# Patient Record
Sex: MALE | Race: WHITE | NOT HISPANIC OR LATINO | Employment: STUDENT | URBAN - NONMETROPOLITAN AREA
[De-identification: names, ages, dates, MRNs, and addresses within clinical notes are randomized per-mention and may not be internally consistent; named-entity substitution may affect disease eponyms.]

---
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
## 2023-02-06 ENCOUNTER — HOSPITAL ENCOUNTER (EMERGENCY)
Facility: HOSPITAL | Age: 18
Discharge: PSYCHIATRIC OR SUBSTANCE USE FACILITY | DRG: 881 | End: 2023-02-06
Attending: EMERGENCY MEDICINE | Admitting: EMERGENCY MEDICINE
Payer: COMMERCIAL

## 2023-02-06 ENCOUNTER — HOSPITAL ENCOUNTER (INPATIENT)
Facility: HOSPITAL | Age: 18
LOS: 2 days | Discharge: HOME | DRG: 881 | End: 2023-02-08
Attending: PSYCHIATRY & NEUROLOGY | Admitting: PSYCHIATRY & NEUROLOGY
Payer: COMMERCIAL

## 2023-02-06 VITALS
WEIGHT: 195 LBS | SYSTOLIC BLOOD PRESSURE: 140 MMHG | DIASTOLIC BLOOD PRESSURE: 74 MMHG | OXYGEN SATURATION: 97 % | HEART RATE: 78 BPM | HEIGHT: 72 IN | BODY MASS INDEX: 26.41 KG/M2 | RESPIRATION RATE: 18 BRPM | TEMPERATURE: 97.7 F

## 2023-02-06 DIAGNOSIS — R45.851 SUICIDAL IDEATION: Primary | ICD-10-CM

## 2023-02-06 PROBLEM — F32.A DEPRESSION WITH SUICIDAL IDEATION: Status: ACTIVE | Noted: 2023-02-06

## 2023-02-06 LAB
ALBUMIN SERPL-MCNC: 4.8 G/DL (ref 3.5–5.2)
ALBUMIN/GLOB SERPL: 1.9 G/DL
ALP SERPL-CCNC: 52 U/L (ref 39–117)
ALT SERPL W P-5'-P-CCNC: 11 U/L (ref 1–41)
AMPHET+METHAMPHET UR QL: NEGATIVE
AMPHETAMINES UR QL: NEGATIVE
ANION GAP SERPL CALCULATED.3IONS-SCNC: 9.1 MMOL/L (ref 5–15)
AST SERPL-CCNC: 15 U/L (ref 1–40)
BACTERIA UR QL AUTO: ABNORMAL /HPF
BARBITURATES UR QL SCN: NEGATIVE
BASOPHILS # BLD AUTO: 0.02 10*3/MM3 (ref 0–0.2)
BASOPHILS NFR BLD AUTO: 0.5 % (ref 0–1.5)
BENZODIAZ UR QL SCN: NEGATIVE
BILIRUB SERPL-MCNC: 1 MG/DL (ref 0–1.2)
BILIRUB UR QL STRIP: ABNORMAL
BUN SERPL-MCNC: 10 MG/DL (ref 6–20)
BUN/CREAT SERPL: 10.5 (ref 7–25)
BUPRENORPHINE SERPL-MCNC: NEGATIVE NG/ML
CALCIUM SPEC-SCNC: 9.5 MG/DL (ref 8.6–10.5)
CANNABINOIDS SERPL QL: POSITIVE
CHLORIDE SERPL-SCNC: 104 MMOL/L (ref 98–107)
CLARITY UR: CLEAR
CO2 SERPL-SCNC: 26.9 MMOL/L (ref 22–29)
COCAINE UR QL: NEGATIVE
COLOR UR: ABNORMAL
CREAT SERPL-MCNC: 0.95 MG/DL (ref 0.76–1.27)
DEPRECATED RDW RBC AUTO: 39.5 FL (ref 37–54)
EGFRCR SERPLBLD CKD-EPI 2021: 118.2 ML/MIN/1.73
EOSINOPHIL # BLD AUTO: 0.12 10*3/MM3 (ref 0–0.4)
EOSINOPHIL NFR BLD AUTO: 2.9 % (ref 0.3–6.2)
ERYTHROCYTE [DISTWIDTH] IN BLOOD BY AUTOMATED COUNT: 12 % (ref 12.3–15.4)
ETHANOL BLD-MCNC: <10 MG/DL (ref 0–10)
ETHANOL UR QL: <0.01 %
FLUAV RNA RESP QL NAA+PROBE: NOT DETECTED
FLUBV RNA RESP QL NAA+PROBE: NOT DETECTED
GLOBULIN UR ELPH-MCNC: 2.5 GM/DL
GLUCOSE SERPL-MCNC: 86 MG/DL (ref 65–99)
GLUCOSE UR STRIP-MCNC: NEGATIVE MG/DL
HCT VFR BLD AUTO: 47.2 % (ref 37.5–51)
HGB BLD-MCNC: 15.9 G/DL (ref 13–17.7)
HGB UR QL STRIP.AUTO: NEGATIVE
HYALINE CASTS UR QL AUTO: ABNORMAL /LPF
IMM GRANULOCYTES # BLD AUTO: 0.01 10*3/MM3 (ref 0–0.05)
IMM GRANULOCYTES NFR BLD AUTO: 0.2 % (ref 0–0.5)
KETONES UR QL STRIP: ABNORMAL
LEUKOCYTE ESTERASE UR QL STRIP.AUTO: NEGATIVE
LYMPHOCYTES # BLD AUTO: 1.78 10*3/MM3 (ref 0.7–3.1)
LYMPHOCYTES NFR BLD AUTO: 42.7 % (ref 19.6–45.3)
MAGNESIUM SERPL-MCNC: 2.2 MG/DL (ref 1.7–2.2)
MCH RBC QN AUTO: 30.2 PG (ref 26.6–33)
MCHC RBC AUTO-ENTMCNC: 33.7 G/DL (ref 31.5–35.7)
MCV RBC AUTO: 89.6 FL (ref 79–97)
METHADONE UR QL SCN: NEGATIVE
MONOCYTES # BLD AUTO: 0.35 10*3/MM3 (ref 0.1–0.9)
MONOCYTES NFR BLD AUTO: 8.4 % (ref 5–12)
NEUTROPHILS NFR BLD AUTO: 1.89 10*3/MM3 (ref 1.7–7)
NEUTROPHILS NFR BLD AUTO: 45.3 % (ref 42.7–76)
NITRITE UR QL STRIP: NEGATIVE
NRBC BLD AUTO-RTO: 0 /100 WBC (ref 0–0.2)
OPIATES UR QL: NEGATIVE
OXYCODONE UR QL SCN: NEGATIVE
PCP UR QL SCN: NEGATIVE
PH UR STRIP.AUTO: 5.5 [PH] (ref 5–8)
PLATELET # BLD AUTO: 214 10*3/MM3 (ref 140–450)
PMV BLD AUTO: 9.8 FL (ref 6–12)
POTASSIUM SERPL-SCNC: 3.7 MMOL/L (ref 3.5–5.2)
PROPOXYPH UR QL: NEGATIVE
PROT SERPL-MCNC: 7.3 G/DL (ref 6–8.5)
PROT UR QL STRIP: ABNORMAL
QT INTERVAL: 392 MS
QTC INTERVAL: 388 MS
RBC # BLD AUTO: 5.27 10*6/MM3 (ref 4.14–5.8)
RBC # UR STRIP: ABNORMAL /HPF
REF LAB TEST METHOD: ABNORMAL
SARS-COV-2 RNA RESP QL NAA+PROBE: NOT DETECTED
SODIUM SERPL-SCNC: 140 MMOL/L (ref 136–145)
SP GR UR STRIP: >1.03 (ref 1–1.03)
SQUAMOUS #/AREA URNS HPF: ABNORMAL /HPF
TRICYCLICS UR QL SCN: NEGATIVE
UROBILINOGEN UR QL STRIP: ABNORMAL
WBC # UR STRIP: ABNORMAL /HPF
WBC NRBC COR # BLD: 4.17 10*3/MM3 (ref 3.4–10.8)

## 2023-02-06 PROCEDURE — 83735 ASSAY OF MAGNESIUM: CPT | Performed by: EMERGENCY MEDICINE

## 2023-02-06 PROCEDURE — 93005 ELECTROCARDIOGRAM TRACING: CPT | Performed by: PSYCHIATRY & NEUROLOGY

## 2023-02-06 PROCEDURE — 99284 EMERGENCY DEPT VISIT MOD MDM: CPT

## 2023-02-06 PROCEDURE — 36415 COLL VENOUS BLD VENIPUNCTURE: CPT

## 2023-02-06 PROCEDURE — C9803 HOPD COVID-19 SPEC COLLECT: HCPCS

## 2023-02-06 PROCEDURE — 87636 SARSCOV2 & INF A&B AMP PRB: CPT | Performed by: EMERGENCY MEDICINE

## 2023-02-06 PROCEDURE — 81001 URINALYSIS AUTO W/SCOPE: CPT | Performed by: EMERGENCY MEDICINE

## 2023-02-06 PROCEDURE — 85025 COMPLETE CBC W/AUTO DIFF WBC: CPT | Performed by: EMERGENCY MEDICINE

## 2023-02-06 PROCEDURE — 80053 COMPREHEN METABOLIC PANEL: CPT | Performed by: EMERGENCY MEDICINE

## 2023-02-06 PROCEDURE — 80306 DRUG TEST PRSMV INSTRMNT: CPT | Performed by: EMERGENCY MEDICINE

## 2023-02-06 PROCEDURE — 82077 ASSAY SPEC XCP UR&BREATH IA: CPT | Performed by: EMERGENCY MEDICINE

## 2023-02-06 RX ORDER — FAMOTIDINE 20 MG/1
20 TABLET, FILM COATED ORAL 2 TIMES DAILY PRN
Status: DISCONTINUED | OUTPATIENT
Start: 2023-02-06 | End: 2023-02-08 | Stop reason: HOSPADM

## 2023-02-06 RX ORDER — IBUPROFEN 400 MG/1
400 TABLET ORAL EVERY 6 HOURS PRN
Status: DISCONTINUED | OUTPATIENT
Start: 2023-02-06 | End: 2023-02-08 | Stop reason: HOSPADM

## 2023-02-06 RX ORDER — BENZTROPINE MESYLATE 1 MG/1
2 TABLET ORAL ONCE AS NEEDED
Status: DISCONTINUED | OUTPATIENT
Start: 2023-02-06 | End: 2023-02-08 | Stop reason: HOSPADM

## 2023-02-06 RX ORDER — ONDANSETRON 4 MG/1
4 TABLET, FILM COATED ORAL EVERY 6 HOURS PRN
Status: DISCONTINUED | OUTPATIENT
Start: 2023-02-06 | End: 2023-02-08 | Stop reason: HOSPADM

## 2023-02-06 RX ORDER — HYDROXYZINE HYDROCHLORIDE 25 MG/1
50 TABLET, FILM COATED ORAL EVERY 6 HOURS PRN
Status: DISCONTINUED | OUTPATIENT
Start: 2023-02-06 | End: 2023-02-08 | Stop reason: HOSPADM

## 2023-02-06 RX ORDER — BENZONATATE 100 MG/1
100 CAPSULE ORAL 3 TIMES DAILY PRN
Status: DISCONTINUED | OUTPATIENT
Start: 2023-02-06 | End: 2023-02-08 | Stop reason: HOSPADM

## 2023-02-06 RX ORDER — ACETAMINOPHEN 325 MG/1
650 TABLET ORAL EVERY 6 HOURS PRN
Status: DISCONTINUED | OUTPATIENT
Start: 2023-02-06 | End: 2023-02-08 | Stop reason: HOSPADM

## 2023-02-06 RX ORDER — LOPERAMIDE HYDROCHLORIDE 2 MG/1
2 CAPSULE ORAL
Status: DISCONTINUED | OUTPATIENT
Start: 2023-02-06 | End: 2023-02-08 | Stop reason: HOSPADM

## 2023-02-06 RX ORDER — ALUMINA, MAGNESIA, AND SIMETHICONE 2400; 2400; 240 MG/30ML; MG/30ML; MG/30ML
15 SUSPENSION ORAL EVERY 6 HOURS PRN
Status: DISCONTINUED | OUTPATIENT
Start: 2023-02-06 | End: 2023-02-08 | Stop reason: HOSPADM

## 2023-02-06 RX ORDER — TRAZODONE HYDROCHLORIDE 50 MG/1
50 TABLET ORAL NIGHTLY PRN
Status: DISCONTINUED | OUTPATIENT
Start: 2023-02-06 | End: 2023-02-08 | Stop reason: HOSPADM

## 2023-02-06 RX ORDER — BENZTROPINE MESYLATE 1 MG/ML
1 INJECTION INTRAMUSCULAR; INTRAVENOUS ONCE AS NEEDED
Status: DISCONTINUED | OUTPATIENT
Start: 2023-02-06 | End: 2023-02-08 | Stop reason: HOSPADM

## 2023-02-06 RX ORDER — ECHINACEA PURPUREA EXTRACT 125 MG
2 TABLET ORAL AS NEEDED
Status: DISCONTINUED | OUTPATIENT
Start: 2023-02-06 | End: 2023-02-08 | Stop reason: HOSPADM

## 2023-02-06 RX ADMIN — ACETAMINOPHEN 650 MG: 325 TABLET ORAL at 22:23

## 2023-02-06 RX ADMIN — TRAZODONE HYDROCHLORIDE 50 MG: 50 TABLET ORAL at 22:23

## 2023-02-06 NOTE — ED PROVIDER NOTES
"Subjective   History of Present Illness  Patient is a 17-year-old male with significant past medical history positive for depression presenting to the ER for psychiatric evaluation for suicidal ideation.  Patient states \"yesterday I had a really bad episode of suicidal ideation, it may perhaps be a pattern of johan and depression, maybe bipolar, I have no idea, I wouldn't have killed myself, but hurt myself if I wouldn't have had a close friend with me, I am fine right now, but it changes very rapidly\".  States he called his parents and they drove to his college in [place] and picked him up, states the was fine by the time they got there, but it happens very rapidly.  Patient states MDD and suicidal ideations have been happening since the age of 11, and got really bad at the age of 13, states \"I know bipolar runs in my family, I have had mood swings all my life, I don't want to self diagnose obviously, but I am always up and down\".  Pt states \"I feel fine today honestly, but yesterday, if my girlfriend was not with me yesterday I would have been cutting myself\".  Pt states \"this has been happening for a couple of years intensely now, but yesterday I was an immediate threat to myself\".  Patient denies SI at this time, but states \"I can't for see what is going to happen\".  Patient denies any HI/AVH.  Patient states he drinks alcohol occasionally at college states he \"eats\" mushrooms and does LSD to help him be at peace with the world and himself and absorb information. Last use was about 1 month ago.  Patient denies any additional symptoms at this time.         History provided by:  Patient   used: No        Review of Systems   Constitutional: Negative.  Negative for fever.   HENT: Negative.    Respiratory: Negative.    Cardiovascular: Negative.  Negative for chest pain.   Gastrointestinal: Negative.  Negative for abdominal pain.   Endocrine: Negative.    Genitourinary: Negative.  Negative " for dysuria.   Skin: Negative.    Neurological: Negative.    Psychiatric/Behavioral: Positive for suicidal ideas.   All other systems reviewed and are negative.      Past Medical History:   Diagnosis Date   • Depression    • Headache    • Knee pain     left   • Upper leg pain left        No Known Allergies    Past Surgical History:   Procedure Laterality Date   • FEMUR OSTEOCHONDROMA Left 5/29/2018    Procedure: EXCISION LEFT DISTAL FEMUR OSTEOCHONDROMA;  Surgeon: Vikram Barraza MD;  Location: [facility] OR;  Service: Orthopedics   • KNEE ARTHROSCOPY Left 6/14/2018    Procedure: KNEE ARTHROSCOPY WITH LATERAL MENISCUS REPAIR;  Surgeon: Vikram Barraza MD;  Location: [facility] OR;  Service: Orthopedics       Family History   Problem Relation Age of Onset   • Osteoarthritis Mother    • Osteoarthritis Father    • Osteoarthritis Paternal Grandmother    • Cancer Paternal Grandmother    • Osteoarthritis Paternal Grandfather        Social History     Socioeconomic History   • Marital status: Single   Tobacco Use   • Smoking status: Never   • Smokeless tobacco: Never   Vaping Use   • Vaping Use: Never used   Substance and Sexual Activity   • Alcohol use: Yes     Comment: occasionally   • Drug use: Yes     Types: Marijuana, LSD, Other     Comment: mushrooms   • Sexual activity: Yes     Partners: Female           Objective   Physical Exam  Vitals and nursing note reviewed.   Constitutional:       General: He is not in acute distress.     Appearance: He is well-developed. He is not ill-appearing or diaphoretic.   HENT:      Head: Normocephalic and atraumatic.      Right Ear: External ear normal.      Left Ear: External ear normal.      Nose: Nose normal.   Eyes:      Conjunctiva/sclera: Conjunctivae normal.      Pupils: Pupils are equal, round, and reactive to light.   Neck:      Vascular: No JVD.      Trachea: No tracheal deviation.   Cardiovascular:      Rate and Rhythm: Normal rate and regular rhythm.      Heart  sounds: Normal heart sounds. No murmur heard.  Pulmonary:      Effort: Pulmonary effort is normal. No respiratory distress.      Breath sounds: Normal breath sounds. No wheezing.   Abdominal:      General: Bowel sounds are normal.      Palpations: Abdomen is soft.      Tenderness: There is no abdominal tenderness.   Musculoskeletal:         General: No deformity. Normal range of motion.      Cervical back: Normal range of motion and neck supple.   Skin:     General: Skin is warm and dry.      Coloration: Skin is not pale.      Findings: No erythema or rash.   Neurological:      Mental Status: He is alert and oriented to person, place, and time.      Cranial Nerves: No cranial nerve deficit.   Psychiatric:         Mood and Affect: Mood is depressed.         Thought Content: Thought content is not paranoid or delusional. Thought content includes suicidal ideation. Thought content does not include homicidal ideation. Thought content does not include homicidal or suicidal plan.         Procedures       Results for orders placed or performed during the hospital encounter of 02/06/23   COVID-19 and FLU A/B PCR - Swab, Nasopharynx    Specimen: Nasopharynx; Swab   Result Value Ref Range    COVID19 Not Detected Not Detected - Ref. Range    Influenza A PCR Not Detected Not Detected    Influenza B PCR Not Detected Not Detected   Comprehensive Metabolic Panel    Specimen: Arm, Right; Blood   Result Value Ref Range    Glucose 86 65 - 99 mg/dL    BUN 10 6 - 20 mg/dL    Creatinine 0.95 0.76 - 1.27 mg/dL    Sodium 140 136 - 145 mmol/L    Potassium 3.7 3.5 - 5.2 mmol/L    Chloride 104 98 - 107 mmol/L    CO2 26.9 22.0 - 29.0 mmol/L    Calcium 9.5 8.6 - 10.5 mg/dL    Total Protein 7.3 6.0 - 8.5 g/dL    Albumin 4.8 3.5 - 5.2 g/dL    ALT (SGPT) 11 1 - 41 U/L    AST (SGOT) 15 1 - 40 U/L    Alkaline Phosphatase 52 39 - 117 U/L    Total Bilirubin 1.0 0.0 - 1.2 mg/dL    Globulin 2.5 gm/dL    A/G Ratio 1.9 g/dL    BUN/Creatinine Ratio 10.5  7.0 - 25.0    Anion Gap 9.1 5.0 - 15.0 mmol/L    eGFR 118.2 >60.0 mL/min/1.73   Urinalysis With Microscopic If Indicated (No Culture) - Urine, Clean Catch    Specimen: Urine, Clean Catch   Result Value Ref Range    Color, UA Dark Yellow (A) Yellow, Straw    Appearance, UA Clear Clear    pH, UA 5.5 5.0 - 8.0    Specific Gravity, UA >1.030 (H) 1.005 - 1.030    Glucose, UA Negative Negative    Ketones, UA Trace (A) Negative    Bilirubin, UA Small (1+) (A) Negative    Blood, UA Negative Negative    Protein, UA 30 mg/dL (1+) (A) Negative    Leuk Esterase, UA Negative Negative    Nitrite, UA Negative Negative    Urobilinogen, UA 1.0 E.U./dL 0.2 - 1.0 E.U./dL   Urine Drug Screen - Urine, Clean Catch    Specimen: Urine, Clean Catch   Result Value Ref Range    THC, Screen, Urine Positive (A) Negative    Phencyclidine (PCP), Urine Negative Negative    Cocaine Screen, Urine Negative Negative    Methamphetamine, Ur Negative Negative    Opiate Screen Negative Negative    Amphetamine Screen, Urine Negative Negative    Benzodiazepine Screen, Urine Negative Negative    Tricyclic Antidepressants Screen Negative Negative    Methadone Screen, Urine Negative Negative    Barbiturates Screen, Urine Negative Negative    Oxycodone Screen, Urine Negative Negative    Propoxyphene Screen Negative Negative    Buprenorphine, Screen, Urine Negative Negative   Magnesium    Specimen: Arm, Right; Blood   Result Value Ref Range    Magnesium 2.2 1.7 - 2.2 mg/dL   Ethanol    Specimen: Arm, Right; Blood   Result Value Ref Range    Ethanol <10 0 - 10 mg/dL    Ethanol % <0.010 %   CBC Auto Differential    Specimen: Arm, Right; Blood   Result Value Ref Range    WBC 4.17 3.40 - 10.80 10*3/mm3    RBC 5.27 4.14 - 5.80 10*6/mm3    Hemoglobin 15.9 13.0 - 17.7 g/dL    Hematocrit 47.2 37.5 - 51.0 %    MCV 89.6 79.0 - 97.0 fL    MCH 30.2 26.6 - 33.0 pg    MCHC 33.7 31.5 - 35.7 g/dL    RDW 12.0 (L) 12.3 - 15.4 %    RDW-SD 39.5 37.0 - 54.0 fl    MPV 9.8 6.0 - 12.0  "fL    Platelets 214 140 - 450 10*3/mm3    Neutrophil % 45.3 42.7 - 76.0 %    Lymphocyte % 42.7 19.6 - 45.3 %    Monocyte % 8.4 5.0 - 12.0 %    Eosinophil % 2.9 0.3 - 6.2 %    Basophil % 0.5 0.0 - 1.5 %    Immature Grans % 0.2 0.0 - 0.5 %    Neutrophils, Absolute 1.89 1.70 - 7.00 10*3/mm3    Lymphocytes, Absolute 1.78 0.70 - 3.10 10*3/mm3    Monocytes, Absolute 0.35 0.10 - 0.90 10*3/mm3    Eosinophils, Absolute 0.12 0.00 - 0.40 10*3/mm3    Basophils, Absolute 0.02 0.00 - 0.20 10*3/mm3    Immature Grans, Absolute 0.01 0.00 - 0.05 10*3/mm3    nRBC 0.0 0.0 - 0.2 /100 WBC   Urinalysis, Microscopic Only - Urine, Clean Catch    Specimen: Urine, Clean Catch   Result Value Ref Range    RBC, UA 0-2 None Seen, 0-2 /HPF    WBC, UA 0-2 None Seen, 0-2 /HPF    Bacteria, UA 1+ (A) None Seen /HPF    Squamous Epithelial Cells, UA 0-2 None Seen, 0-2 /HPF    Hyaline Casts, UA 3-6 None Seen /LPF    Methodology Automated Microscopy      No orders to display       ED Course                                           Medical Decision Making  Patient is a 17-year-old male with significant past medical history positive for depression presenting to the ER for psychiatric evaluation for suicidal ideation.  Patient states \"yesterday I had a really bad episode of suicidal ideation, it may perhaps be a pattern of johan and depression, maybe bipolar, I have no idea, I wouldn't have killed myself, but hurt myself if I wouldn't have had a close friend with me, I am fine right now, but it changes very rapidly\".  States he called his parents and they drove to his college in [place] and picked him up, states the was fine by the time they got there, but it happens very rapidly.  Patient states MDD and suicidal ideations have been happening since the age of 11, and got really bad at the age of 13, states \"I know bipolar runs in my family, I have had mood swings all my life, I don't want to self diagnose obviously, but I am always up and down\".  Pt " "states \"I feel fine today honestly, but yesterday, if my girlfriend was not with me yesterday I would have been cutting myself\".  Pt states \"this has been happening for a couple of years intensely now, but yesterday I was an immediate threat to myself\".  Patient denies SI at this time, but states \"I can't for see what is going to happen\".  Patient denies any HI/AVH.  Patient states he drinks alcohol occasionally at college states he \"eats\" mushrooms and does LSD to help him be at peace with the world and himself and absorb information. Last use was about 1 month ago.  Patient denies any additional symptoms at this time.  Patient's ED course unremarkable patient to be admitted to psychiatric unit for further evaluation.  Patient verbalized understanding of plan of care.         Suicidal ideation: complicated acute illness or injury  Amount and/or Complexity of Data Reviewed  Labs: ordered.          Final diagnoses:   Suicidal ideation       ED Disposition  ED Disposition     ED Disposition   DC/Transfer to Behavioral Health    [facility]   Stable    Comment   --             No follow-up provider specified.       Medication List      No changes were made to your prescriptions during this visit.          Merna Rader, APRN  02/06/23 [id number]    "

## 2023-02-06 NOTE — NURSING NOTE
"Pt states \"yesterday I had a really bad episode of suicidal ideation, it may perhaps be a pattern of johan and depression, maybe bipolar, I have no idea, I wouldn't have killed myself, but hurt myself if I wouldn't have had a close friend with me, I am fine right now, but it changes very rapidly\".  States he called his parents and they drove to his college in [place] and picked him up, states the was fine by the time they got there, but it happens very rapidly.  Pt states MDD and suicidal ideations have been happening since the age of 11, and got really bad at the age of 13, states \"I know bipolar runs in my family, I have had mood swings all my life, I don't want to self diagnose obviously, but I am always up and down\".  Pt states \"I feel fine today honestly, but yesterday, if my girlfriend was not with me yesterday I would have been cutting myself\".  Pt states \"this has been happening for a couple of years intensely now, but yesterday I was an immediate threat to myself\".    Pt denies SI at this time, but states \"I can't for see what is going to happen\"    Pt denies any HI/AVH    Pt states he drinks alcohol occasionally at college     Pt states he smokes cannabis 4-5 times weekly, smoking less than 1/2 gram at a time, states he last smoked 2 days ago.    Pt states he \"eats\" mushrooms and does LSD to help him be at peace with the world and himself and absorb information. Last use was about 1 month ago.     Pt is a full time student at [employer]     Pt rates depression 3/10 and anxiety 5/10 at this time   "

## 2023-02-06 NOTE — PLAN OF CARE
Problem: Adult Behavioral Health Plan of Care  Goal: Plan of Care Review  Recent Flowsheet Documentation  Taken 2/6/2023 1755 by Jessie Mazariegos, RN  Plan of Care Reviewed With: patient  Patient Agreement with Plan of Care: agrees   Goal Outcome Evaluation:  Plan of Care Reviewed With: patient  Patient Agreement with Plan of Care: agrees      New admission.  Care plan initiated.

## 2023-02-06 NOTE — NURSING NOTE
Pt is aware of no bed availability at this time, but that there will be discharges and a bed available within the day, and is verbally agreeable to wait.

## 2023-02-06 NOTE — NURSING NOTE
Spoke to Dr. Almanza, discussed assessment and labs, new orders to admit the patient to A&E with routine orders SP3. [id number]

## 2023-02-07 PROCEDURE — 99223 1ST HOSP IP/OBS HIGH 75: CPT | Performed by: PSYCHIATRY & NEUROLOGY

## 2023-02-07 RX ADMIN — TRAZODONE HYDROCHLORIDE 50 MG: 50 TABLET ORAL at 21:28

## 2023-02-07 RX ADMIN — SERTRALINE 25 MG: 50 TABLET, FILM COATED ORAL at 14:24

## 2023-02-07 RX ADMIN — ACETAMINOPHEN 650 MG: 325 TABLET ORAL at 17:57

## 2023-02-07 NOTE — PLAN OF CARE
Problem: Adult Behavioral Health Plan of Care  Goal: Plan of Care Review  Outcome: Ongoing, Progressing  Flowsheets  Taken 2/7/2023 1429 by Chavo Conley, RN  Plan of Care Reviewed With: patient  Outcome Evaluation: PATIENT VERBALIZES ANXIETY AND DEPRESSION THIS SHIFT BUT WILL NOT RATE ON NUMBER SCALE. PATIENT IS AOX3, COOPERATIVE WITH NO S/S OF ACUTE DISTRESS NOTED. NEW ORDERS : ZOLOFT 25MG  Taken 2/7/2023 1045 by Alethea rPide MSW  Progress: improving  Patient Agreement with Plan of Care: agrees  Taken 2/7/2023 0949 by Chavo Conley, RN  Plan of Care Reviewed With: patient  Patient Agreement with Plan of Care: agrees   Goal Outcome Evaluation:  Plan of Care Reviewed With: patient  Patient Agreement with Plan of Care: agrees        Outcome Evaluation: PATIENT VERBALIZES ANXIETY AND DEPRESSION THIS SHIFT BUT WILL NOT RATE ON NUMBER SCALE. PATIENT IS AOX3, COOPERATIVE WITH NO S/S OF ACUTE DISTRESS NOTED. NEW ORDERS : ZOLOFT 25MG

## 2023-02-07 NOTE — PLAN OF CARE
Goal Outcome Evaluation:  Plan of Care Reviewed With: patient  Patient Agreement with Plan of Care: agrees        Pt states anxiety 6, depression 6. Pt is calm and cooperative with staff, med compliant.

## 2023-02-07 NOTE — H&P
INITIAL PSYCHIATRIC HISTORY & PHYSICAL    Patient Identification:  Name:  Ravinder Irwin  Age:  17 y.o.  Sex:  male  :  2005  MRN:  [id number]   Visit Number:  [id number]  Primary Care Physician:  Edenilson Subramanian MD    SUBJECTIVE    CC/Focus of Exam: depression, concern for SI    HPI: Ravinder Irwin is a 17 y.o. male who was admitted on 2023 with complaints of depression and concern for SI.      Patient reports a history of rapid mood swings, elevated anxiety, low frustration tolerance, obsessive thinking that often leads to perseveration on negative thoughts and attitudes about himself.  He reports it is often these obsessive thoughts that lead to his mood disturbance, anxiety and SI.  He has a history of self-harm, but none in the last year.  He was concern for the worsening of possible suicidality, prompting him to call his parents, who picked him up from college and brought him to the hospital.  Symptoms are severe, persistent, present multiple settings, worse in the last several months, worse by self doubt, improved by therapy.      PAST PSYCHIATRIC HX:   Dx: Autism  IP: denies   OP: Patient reports previous therapy at multiple times in his life  Current meds: Patient denies any  Previous meds: Patient denies any  SH/SI/SA: Self harm via cutting/intermittent/denied  Trauma: Denied    SUBSTANCE USE HX:   Patient reports intermittent use of THC and mushrooms.  UDS was positive for THC.       SOCIAL HX: Patient states that he was born in [place], [place]. Patient states that he was raised in [place], [place]. Patient states that he resides with his parents in [place] when he is not in school but resides in [facility] When he is in school.  Patient states that he is single and has no children. Patient states that he is currently unemployed. Patient states that he is currently enrolled in college and attends [employer].  Patient denies any legal issues.       FAMILY HX: Patient states there is  mental illness on his mother's side of the family.     Family History   Problem Relation Age of Onset   • Osteoarthritis Mother    • Osteoarthritis Father    • Paranoid behavior Maternal Grandmother    • Schizophrenia Maternal Grandmother    • Bipolar disorder Maternal Grandmother    • Osteoarthritis Paternal Grandfather    • Osteoarthritis Paternal Grandmother    • Cancer Paternal Grandmother        Past Medical History:   Diagnosis Date   • Depression    • Headache    • Knee pain     left   • Self-injurious behavior     Reports hx of self harm starting at age 11.       Past Surgical History:   Procedure Laterality Date   • FEMUR OSTEOCHONDROMA Left 5/29/2018    Procedure: EXCISION LEFT DISTAL FEMUR OSTEOCHONDROMA;  Surgeon: Vikram Barraza MD;  Location: [facility] OR;  Service: Orthopedics   • KNEE ARTHROSCOPY Left 6/14/2018    Procedure: KNEE ARTHROSCOPY WITH LATERAL MENISCUS REPAIR;  Surgeon: Vikram Barraza MD;  Location: [facility] OR;  Service: Orthopedics       No medications prior to admission.         ALLERGIES:  Patient has no known allergies.    Temp:  [97.5 °F (36.4 °C)-98 °F (36.7 °C)] 97.5 °F (36.4 °C)  Heart Rate:  [] 101  Resp:  [18] 18  BP: (115-164)/(58-89) 144/79    REVIEW OF SYSTEMS:  Review of Systems   Psychiatric/Behavioral: Positive for dysphoric mood and suicidal ideas. The patient is nervous/anxious.    All other systems reviewed and are negative.       OBJECTIVE    PHYSICAL EXAM:  Physical Exam  Vitals and nursing note reviewed.   Constitutional:       Appearance: He is well-developed.   HENT:      Head: Normocephalic and atraumatic.      Right Ear: External ear normal.      Left Ear: External ear normal.      Nose: Nose normal.   Eyes:      Pupils: Pupils are equal, round, and reactive to light.   Pulmonary:      Effort: Pulmonary effort is normal. No respiratory distress.      Breath sounds: Normal breath sounds.   Abdominal:      General: There is no distension.       Palpations: Abdomen is soft.   Musculoskeletal:         General: No deformity. Normal range of motion.      Cervical back: Normal range of motion and neck supple.   Skin:     General: Skin is warm.      Findings: No rash.   Neurological:      Mental Status: He is alert and oriented to person, place, and time.      Coordination: Coordination normal.         MENTAL STATUS EXAM:   Hygiene:   fair  Cooperation:  Cooperative  Eye Contact:  Good  Psychomotor Behavior:  Appropriate  Affect:  Appropriate  Hopelessness: 5  Speech:  Normal  Thought Process: Linear  Thought Content:  Normal  Suicidal:  None  Homicidal:  None  Hallucinations:  None  Delusion:  None  Memory:  Intact  Orientation:  Person, Place, Time and Situation  Reliability:  fair  Insight:  Poor  Judgment:  Fair  Impulse Control:  Poor      Imaging Results (Last 24 Hours)     ** No results found for the last 24 hours. **           Lab Results   Component Value Date    GLUCOSE 86 02/06/2023    BUN 10 02/06/2023    CREATININE 0.95 02/06/2023    BCR 10.5 02/06/2023    CO2 26.9 02/06/2023    CALCIUM 9.5 02/06/2023    ALBUMIN 4.8 02/06/2023    AST 15 02/06/2023    ALT 11 02/06/2023       Lab Results   Component Value Date    WBC 4.17 02/06/2023    HGB 15.9 02/06/2023    HCT 47.2 02/06/2023    MCV 89.6 02/06/2023     02/06/2023       ECG/EMG Results (most recent)     Procedure Component Value Units Date/Time    ECG 12 Lead Other [[id number]] Collected: 02/06/23 1638     Updated: 02/06/23 2309     QT Interval 392 ms      QTC Interval 388 ms     Narrative:      Test Reason : Baseline Cardiac Status~  Blood Pressure :   */*   mmHG  Vent. Rate :  59 BPM     Atrial Rate :  59 BPM     P-R Int : 130 ms          QRS Dur :  94 ms      QT Int : 392 ms       P-R-T Axes :  56  66  49 degrees     QTc Int : 388 ms    Sinus bradycardia  Otherwise normal ECG  No previous ECGs available  Confirmed by Ann Youssef (2005) on 2/6/2023 11:09:33 PM    Referred By:             Confirmed By: Ann Youssef           Brief Urine Lab Results  (Last result in the past 365 days)      Color   Clarity   Blood   Leuk Est   Nitrite   Protein   CREAT   Urine HCG        02/06/23 1119 Dark Yellow   Clear   Negative   Negative   Negative   30 mg/dL (1+)                 Last Urine Toxicity     LAST URINE TOXICITY RESULTS Latest Ref Rng & Units 2/6/2023    AMPHETAMINES SCREEN, URINE Negative Negative    BARBITURATES SCREEN Negative Negative    BENZODIAZEPINE SCREEN, URINE Negative Negative    BUPRENORPHINEUR Negative Negative    COCAINE SCREEN, URINE Negative Negative    METHADONE SCREEN, URINE Negative Negative    METHAMPHETAMINEUR Negative Negative          Chart, notes, vitals, labs personally reviewed.  Outside [facility] report requested, reviewed, no controlled substances filled in [place] over the last year  UDS results:  + THC  EKG tracing personally reviewed, interpreted as sinus bradycardia, QTc interval 388  Consulted with patient's therapist regarding clinical history and treatment plan    ASSESSMENT & PLAN:    Suicidal Ideation  -Patient reports concern for emergence of SI, but denies active suicidality or intent to harm himself at any point  -Admit for crisis stabilization  -SP3    Unspecified anxiety disorder  -Likely related to ASD.  Presentation has elements of OCD, as well  -Begin sertraline 25 mg daily  -Patient is highly intelligent and well spoken.  He would do well in continued psychotherapy.  We will refer to outpatient therapist and psychiatric care    Autism spectrum disorder  -Prior diagnosis.  Likely contributing to current symptom burden  -Outpatient care as above    Cannabis use disorder, severe, dependence  -Admission UDS positive  -Supportive care  -Ascertain substance abuse treatment plans following discharge    The patient has been admitted for safety and stabilization.  Patient will be monitored for suicidality daily and maintained on Special Precautions Level 3 (q15  min checks) .  The patient will have individual and group therapy with a master's level therapist. A master treatment plan will be developed and agreed upon by the patient and his/her treatment team.  The patient's estimated length of stay in the hospital is 5-7 days.     This note was generated using a scribe, Estefanía Sandoval.  The work documented in this note was completed, reviewed, and approved by the attending psychiatrist as designated Dr. Jeffery Almanza electronic signature.

## 2023-02-07 NOTE — DISCHARGE INSTR - APPOINTMENTS
[facility]  [email or web address]  [address], [place] [address]   [phone] - office  [phone] - fax - please fax d/c summary when available.     You have an appointment with Silvino on February 9 2023 at 11:30am.

## 2023-02-07 NOTE — PLAN OF CARE
Goal Outcome Evaluation:  Plan of Care Reviewed With: patient, mother  Patient Agreement with Plan of Care: agrees  Consent Given to Review Plan with: janki and father  Progress: improving  Outcome Evaluation: Therapist met with Patient to review care plan social history, and aftercare recommendations; Patient agreeable.      Problem: Adult Behavioral Health Plan of Care  Goal: Plan of Care Review  Outcome: Ongoing, Progressing  Flowsheets (Taken 2/7/2023 1045)  Consent Given to Review Plan with: janki and father  Progress: improving  Plan of Care Reviewed With:   patient   mother  Patient Agreement with Plan of Care: agrees  Outcome Evaluation:   Therapist met with Patient to review care plan social history, and aftercare recommendations   Patient agreeable.  Goal: Patient-Specific Goal (Individualization)  Outcome: Ongoing, Progressing  Flowsheets  Taken 2/7/2023 1045 by Alethea Pride MSW  Patient-Specific Goals (Include Timeframe): Identify 2-3 coping skills, safety plan, and deny SI/HI prior to discharge.  Individualized Care Needs: Therapist to offer 1-4 therapy sessions, aftercare planning, safety planning, family education, group therapy, and brief CBT/MI interventions.  Taken 2/7/2023 1019 by Alethea Pride MSW  Patient Personal Strengths:   resourceful   resilient   self-reliant  Patient Vulnerabilities: lacks insight into illness  Taken 2/6/2023 1755 by Jessie Mazariegos RN  Anxieties, Fears or Concerns: None verbalized  Goal: Optimized Coping Skills in Response to Life Stressors  Outcome: Ongoing, Progressing  Flowsheets (Taken 2/7/2023 1045)  Optimized Coping Skills in Response to Life Stressors: making progress toward outcome  Intervention: Promote Effective Coping Strategies  Flowsheets (Taken 2/7/2023 1045)  Supportive Measures:   active listening utilized   counseling provided   goal-setting facilitated   verbalization of feelings encouraged  Goal: Develops/Participates in Therapeutic  [place] to Support Successful Transition  Outcome: Ongoing, Progressing  Flowsheets (Taken 2/7/2023 1045)  Develops/Participates in Therapeutic [place] to Support Successful Transition: making progress toward outcome  Intervention: Foster Therapeutic [place]  Flowsheets (Taken 2/7/2023 1045)  Trust Relationship/Rapport:   care explained   questions encouraged   choices provided   reassurance provided   emotional support provided   thoughts/feelings acknowledged   empathic listening provided   questions answered  Intervention: Mutually Develop Transition Plan  Flowsheets  Taken 2/7/2023 1045 by Alethea Pride MSW  Outpatient/Agency/Support Group Needs:   outpatient counseling   outpatient medication management  Discharge Coordination/Progress:   Therapist met with Patient to complete discharge needs assessment   Patient agreeable.  Transition Support: follow-up care discussed  Anticipated Discharge Disposition: home or self-care  Transportation Concerns: no car  Current Discharge Risk: psychiatric illness  Concerns to be Addressed:   coping/stress   mental health  Readmission Within the Last 30 Days: no previous admission in last 30 days  Patient's Choice of Community Agency(s): [facility]  Patient/Family Anticipates Transition to: home  Offered/Gave Vendor List: no  Taken 2/6/2023 1755 by Jessie Mazariegos, RN  Transportation Anticipated: family or friend will provide  Patient/Family Anticipated Services at Transition:   mental health services   outpatient care     DATA: Therapist met individually with patient this date to introduce role and to discuss hospitalization expectations. Patient agreeable.     Patient signed consent for his parents, Kana and Christiano Irwin. This therapist called and spoke with Kana today. She states that Patient has struggled with his mental health off and on for the past few years. She states that Patient was seeing good results through therapy with his therapist Silvino at  [facility] in [place]. However Patient stopped going due to his school schedule. She states that Patient is very intelligent and tends to over think things, and develop feelings of hopelessness. She states that they have been resistant to patient starting medications in the past, but they now feel that this may be needed for Patient. She feels that a short hospital stay would benefit Patient more than a long one as he is already complaining of not having enough to occupy his mind while here. This therapist completed safety planning with Fredo. This therapist advised that Patient should not have any access to weapons, harmful objects, or medications. She states that all weapons are secured in her home, and that she will be in touch with Patient's girlfriend regarding safety measures that need to be taken while Patient is on campus. She states that Patient sees his girlfriend daily, and she should be able to administer his medications to him daily.     Patient signed consent for [facility] in [place].      Clinical Maneuvering/Intervention:     Therapist assisted patient in processing above session content; acknowledged and normalized patient’s thoughts, feelings, and concerns.  Discussed the therapist/patient relationship and explain the parameters and limitations of relative confidentiality.  Also discussed the importance of active participation, and honesty to the treatment process.  Encouraged the patient to discuss/vent their feelings, frustrations, and fears concerning their ongoing medical issues and validated their feelings.     Discussed the importance of finding enjoyable activities and coping skills that the patient can engage in a regular basis. Discussed healthy coping skills such as distraction, self love, grounding, thought challenges/reframing, etc.  Provided patient with list of healthy coping skills this date. Discussed the importance of medication compliance.  Praised  "the patient for seeking help and spent the majority of the session building rapport.       Allowed patient to freely discuss issues without interruption or judgment. Provided safe, confidential environment to facilitate the development of positive therapeutic relationship and encourage open, honest communication.      Therapist addressed discharge safety planning this date. Assisted patient in identifying risk factors which would indicate the need for higher level of care after discharge;  including thoughts to harm self or others and/or self-harming behavior. Encouraged patient to call 911, or present to the nearest emergency room should any of these events occur. Discussed crisis intervention services and means to access.  Encouraged securing any objects of harm.       Therapist completed integrated summary, treatment plan, and initiated social history this date.  Therapist is strongly encouraging family involvement in treatment.       ASSESSMENT: Ravinder Irwin is a 17 year old  male living in [facility] where he is a student at [employer]. Patient states that he is a full time student and does not currently have a job. Patient was admitted for SI. He states that he intermittently experiences SI, and is usually able to distract himself from it. Patient's affect is bright and eurhythmic while speaking with this therapist. Patient states \"98% of the time I am like this (referencing his affect and personality)... the other 2% of the time I find myself in a really negative head space that causes some distress.\" Patient states that he is not really having any major stressors in his life at this time, aside from some stress caused by his academics. He states that his family and girlfriend are very good supports for him. Patient states that he does use THC multiple times a week, but that he does not use this as a way to cope with anxiety or depression. He states that he uses it to " enhance experiences, such as watching cartoons. He states that he also uses shrooms occasionally. Patient states that he is here because he thinks that he may need to be started on medication, and he would like to see if he has a specific diagnosis. He is hopeful to not be here very long. Patient was calm and cooperative with assessment.      PLAN:       Patient to remain hospitalized this date.     Treatment team will focus efforts on stabilizing patient's acute symptoms while providing education on healthy coping and crisis management to reduce hospitalizations.   Patient requires daily psychiatrist evaluation and 24/7 nursing supervision to promote patient  safety.     Therapist will offer 1-4 individual sessions, 1 therapy group daily, family education, and appropriate referral.    Therapist recommends outpatient medication management and therapy.

## 2023-02-08 VITALS
HEIGHT: 72 IN | RESPIRATION RATE: 18 BRPM | SYSTOLIC BLOOD PRESSURE: 126 MMHG | OXYGEN SATURATION: 99 % | HEART RATE: 71 BPM | DIASTOLIC BLOOD PRESSURE: 74 MMHG | WEIGHT: 194.2 LBS | TEMPERATURE: 97.2 F | BODY MASS INDEX: 26.3 KG/M2

## 2023-02-08 PROBLEM — F12.20 CANNABIS USE DISORDER, MODERATE, DEPENDENCE: Status: ACTIVE | Noted: 2023-02-08

## 2023-02-08 PROBLEM — F41.9 ANXIETY DISORDER, UNSPECIFIED: Status: ACTIVE | Noted: 2023-02-08

## 2023-02-08 PROBLEM — R45.851 DEPRESSION WITH SUICIDAL IDEATION: Status: RESOLVED | Noted: 2023-02-06 | Resolved: 2023-02-08

## 2023-02-08 PROBLEM — F32.A DEPRESSION WITH SUICIDAL IDEATION: Status: RESOLVED | Noted: 2023-02-06 | Resolved: 2023-02-08

## 2023-02-08 PROBLEM — F43.25 ADJUSTMENT DISORDER WITH MIXED DISTURBANCE OF EMOTIONS AND CONDUCT: Status: ACTIVE | Noted: 2023-02-08

## 2023-02-08 PROBLEM — F84.0 AUTISM SPECTRUM DISORDER: Status: ACTIVE | Noted: 2023-02-08

## 2023-02-08 PROCEDURE — 99239 HOSP IP/OBS DSCHRG MGMT >30: CPT | Performed by: PSYCHIATRY & NEUROLOGY

## 2023-02-08 RX ORDER — SERTRALINE HYDROCHLORIDE 25 MG/1
25 TABLET, FILM COATED ORAL DAILY
Qty: 30 TABLET | Refills: 0 | Status: SHIPPED | OUTPATIENT
Start: 2023-02-09

## 2023-02-08 RX ADMIN — SERTRALINE 25 MG: 50 TABLET, FILM COATED ORAL at 08:27

## 2023-02-08 NOTE — PLAN OF CARE
Goal Outcome Evaluation:  Plan of Care Reviewed With: patient  Patient Agreement with Plan of Care: agrees     Progress: improving  Outcome Evaluation: Pt stated his appetie was fair and he was sleeping good. Pt rated anxiety a 4/10 and depression a 2/10. Pt had no si/hi or avh. Pt was oriented to person, place and time and stated his main concern at this time was that he had a big exam on Thursday for college and was trying to to figure out what he needed to do about that.

## 2023-02-08 NOTE — PROGRESS NOTES
Discharge Planning Assessment  [facility]     Patient Name: Ravinder Irwin  MRN: [id number]  Today's Date: 2/8/2023    Admit Date: 2/6/2023    Patient is being discharged today. Patient denies SI, HI and AVH. Safety planning was completed with Patient and his mother. Patient plans to follow up with [facility], and has been in touch with his therapist there who confirmed that he would see him after discharge. This therapist and our navigator have been making efforts to assure that an aftercare appointment will be established. However have not received confirmations yet. This therapist will follow up to assure that Patient receives an appointment. This therapist spoke with Patient's mother today and made her aware of this. Patient's mother plans to pick Patient up today.         EUGENIA Lucas

## 2023-02-08 NOTE — PROGRESS NOTES
SAFETY/MENTAL HEALTH PLAN    1. Recognizing warning signs: Warning signs that a crisis may be developing such as thoughts, images, mood, situation, behaviors:    Intense feelings of guilt and shame, thoughts to self harm.       2. Internal coping strategies: Things that the patient can do to take their mind off problems without contacting another person such as relaxation techniques, physical activity, etc:    Music, leaving a negative area, changing environment, be outdoors, watch movies.     3. Socialization strategies for distraction and support: People and social settings that provide distraction or support - names or places, telephone:     [employer] club, [email or web address] club, plays poker with friends.     4. Social contact for assistance in resolving crisis: People the patient can ask for help - names and telephone:    Girlfriend, mom, dad, and roommates. Patient has access to phone numbers in his phone.       5. Professionals or agencies contacts to help resolve crisis: Professionals or agencies the patient can contact during a crisis - clinician name/location/phone/emergency contact number, local urgent care services with address/phone, National Suicide Prevention Lifeline (975), Emergency contact 911:      Patient was educated on the crisis hotline, when to call 911, or present to the nearest emergency room.        6. Means restriction: Ways to make the environment safe    Patient states that there are no fire arms in the home. His girlfriend plans to assist with his medications. Safety planning also done with mother.

## 2023-02-09 NOTE — PAYOR COMM NOTE
"Ravinder Irwin (17 y.o. Male)     Date of Birth   2005    Social Security Number       Address   [address] [place] [address]    Home Phone   [phone]    MRN   [id number]       Scientologist   None    Marital Status   Single                            Admission Date   2/6/23    Admission Type   Emergency    Admitting Provider   Jeffery Almanza MD    Attending Provider       Department, Room/Bed   [facility] ADULT PSYCHIATRIC, 1022/1S       Discharge Date   2/8/2023    Discharge Disposition   Home or Self Care    Discharge Destination                               Attending Provider: (none)   Allergies: No Known Allergies    Isolation: None   Infection: None   Code Status: Prior    Ht: 182.9 cm (72\")   Wt: 88.1 kg (194 lb 3.2 oz)    Admission Cmt: None   Principal Problem: Depression with suicidal ideation [F32.A,R45.851]                 Active Insurance as of 2/6/2023     Primary Coverage     Payor Plan Insurance Group Employer/Plan Group    ANTHEM BLUE CROSS ANTHEM BLUE CROSS BLUE SHIELD PPO [id number]     Payor Plan Address Payor Plan Phone Number Payor Plan Fax Number Effective Dates    [address] [phone]  1/1/2023 - None Entered    [place] [address]       Subscriber Name Subscriber Birth Date Member ID       ROHIT IRWIN 4/7/1970 [id number]                 Emergency Contacts      (Rel.) Home Phone Work Phone Mobile Phone    Peggy Irwin (Mother) [phone] -- --    Rohit Irwin (Father) [phone] -- --      Admit date 02/06/2023  Discharged 02/08/2023         Discharge Summaryl      Chavo Conley, RN at 02/08/23 1247        REGULAR DIET    Electronically signed by Chavo Conley, RN at 02/08/23 1247     Chavo Conley, RN at 02/08/23 1247        AS TOLERATED    Electronically signed by Chavo Conley, RN at 02/08/23 1247     Alethea Pride MSW at 02/08/23 1203        Discharge Planning Assessment  [facility]     Patient Name: Ravinder " Alida  MRN: [id number]  Today's Date: 2/8/2023    Admit Date: 2/6/2023    Patient is being discharged today. Patient denies SI, HI and AVH. Safety planning was completed with Patient and his mother. Patient plans to follow up with [facility], and has been in touch with his therapist there who confirmed that he would see him after discharge. This therapist and our navigator have been making efforts to assure that an aftercare appointment will be established. However have not received confirmations yet. This therapist will follow up to assure that Patient receives an appointment. This therapist spoke with Patient's mother today and made her aware of this. Patient's mother plans to pick Patient up today.         EUGENIA Lucas      Electronically signed by Alethea Pride MSW at 02/08/23 1206     Alethea Pride MSW at 02/08/23 1153        SAFETY/MENTAL HEALTH PLAN    1. Recognizing warning signs: Warning signs that a crisis may be developing such as thoughts, images, mood, situation, behaviors:    Intense feelings of guilt and shame, thoughts to self harm.       2. Internal coping strategies: Things that the patient can do to take their mind off problems without contacting another person such as relaxation techniques, physical activity, etc:    Music, leaving a negative area, changing environment, be outdoors, watch movies.     3. Socialization strategies for distraction and support: People and social settings that provide distraction or support - names or places, telephone:     Caving club, [employer] club, plays poker with friends.     4. Social contact for assistance in resolving crisis: People the patient can ask for help - names and telephone:    Girlfriend, mom, dad, and roommates. Patient has access to phone numbers in his phone.       5. Professionals or agencies contacts to help resolve crisis: Professionals or agencies the patient can contact during a crisis - clinician  name/location/phone/emergency contact number, local urgent care services with address/phone, National Suicide Prevention Lifeline (355), Emergency contact 911:      Patient was educated on the crisis hotline, when to call 911, or present to the nearest emergency room.        6. Means restriction: Ways to make the environment safe    Patient states that there are no fire arms in the home. His girlfriend plans to assist with his medications. Safety planning also done with mother.     Electronically signed by Alethea Pride MSW at 02/08/23 1202     Sherrell Fontenot, RN at 02/08/23 0205        Goal Outcome Evaluation:  Plan of Care Reviewed With: patient  Patient Agreement with Plan of Care: agrees     Progress: improving  Outcome Evaluation: Pt stated his appetie was fair and he was sleeping good. Pt rated anxiety a 4/10 and depression a 2/10. Pt had no si/hi or avh. Pt was oriented to person, place and time and stated his main concern at this time was that he had a big exam on Thursday for college and was trying to to figure out what he needed to do about that.    Electronically signed by Sherrell Fontenot, RN at 02/08/23 0205     Chavo Conley, RN at 02/07/23 1430          Problem: Adult Behavioral Health Plan of Care  Goal: Plan of Care Review  Outcome: Ongoing, Progressing  Flowsheets  Taken 2/7/2023 1429 by Chavo Conley, RN  Plan of Care Reviewed With: patient  Outcome Evaluation: PATIENT VERBALIZES ANXIETY AND DEPRESSION THIS SHIFT BUT WILL NOT RATE ON NUMBER SCALE. PATIENT IS AOX3, COOPERATIVE WITH NO S/S OF ACUTE DISTRESS NOTED. NEW ORDERS : ZOLOFT 25MG  Taken 2/7/2023 1045 by Alethea Pride MSW  Progress: improving  Patient Agreement with Plan of Care: agrees  Taken 2/7/2023 0949 by Chavo Conley, RN  Plan of Care Reviewed With: patient  Patient Agreement with Plan of Care: agrees   Goal Outcome Evaluation:  Plan of Care Reviewed With: patient  Patient Agreement with Plan of Care: agrees         Outcome Evaluation: PATIENT VERBALIZES ANXIETY AND DEPRESSION THIS SHIFT BUT WILL NOT RATE ON NUMBER SCALE. PATIENT IS AOX3, COOPERATIVE WITH NO S/S OF ACUTE DISTRESS NOTED. NEW ORDERS : ZOLOFT 25MG    Electronically signed by Chavo Conley RN at 23 1430     Jeffery Almanza MD at 23 1203                INITIAL PSYCHIATRIC HISTORY & PHYSICAL    Patient Identification:  Name:  Ravinder Irwin  Age:  17 y.o.  Sex:  male  :  2005  MRN:  [id number]   Visit Number:  [id number]  Primary Care Physician:  Edenilson Subramanian MD    SUBJECTIVE    CC/Focus of Exam: depression, concern for SI    HPI: Ravinder Irwin is a 17 y.o. male who was admitted on 2023 with complaints of depression and concern for SI.      Patient reports a history of rapid mood swings, elevated anxiety, low frustration tolerance, obsessive thinking that often leads to perseveration on negative thoughts and attitudes about himself.  He reports it is often these obsessive thoughts that lead to his mood disturbance, anxiety and SI.  He has a history of self-harm, but none in the last year.  He was concern for the worsening of possible suicidality, prompting him to call his parents, who picked him up from college and brought him to the hospital.  Symptoms are severe, persistent, present multiple settings, worse in the last several months, worse by self doubt, improved by therapy.      PAST PSYCHIATRIC HX:   Dx: Autism  IP: denies   OP: Patient reports previous therapy at multiple times in his life  Current meds: Patient denies any  Previous meds: Patient denies any  SH/SI/SA: Self harm via cutting/intermittent/denied  Trauma: Denied    SUBSTANCE USE HX:   Patient reports intermittent use of THC and mushrooms.  UDS was positive for THC.       SOCIAL HX: Patient states that he was born in [place], [place]. Patient states that he was raised in [place], [place]. Patient states that he resides with his parents in [place] when he is not in school but  resides in [facility] [facility] When he is in school.  Patient states that he is single and has no children. Patient states that he is currently unemployed. Patient states that he is currently enrolled in college and attends [email or web address].  Patient denies any legal issues.       FAMILY HX: Patient states there is mental illness on his mother's side of the family.     Family History   Problem Relation Age of Onset   • Osteoarthritis Mother    • Osteoarthritis Father    • Paranoid behavior Maternal Grandmother    • Schizophrenia Maternal Grandmother    • Bipolar disorder Maternal Grandmother    • Osteoarthritis Paternal Grandfather    • Osteoarthritis Paternal Grandmother    • Cancer Paternal Grandmother        Past Medical History:   Diagnosis Date   • Depression    • Headache    • Knee pain     left   • Self-injurious behavior     Reports hx of self harm starting at age 11.       Past Surgical History:   Procedure Laterality Date   • FEMUR OSTEOCHONDROMA Left 5/29/2018    Procedure: EXCISION LEFT DISTAL FEMUR OSTEOCHONDROMA;  Surgeon: Vikram Barraza MD;  Location: [facility];  Service: Orthopedics   • KNEE ARTHROSCOPY Left 6/14/2018    Procedure: KNEE ARTHROSCOPY WITH LATERAL MENISCUS REPAIR;  Surgeon: Vikram Barraza MD;  Location: [facility];  Service: Orthopedics       No medications prior to admission.         ALLERGIES:  Patient has no known allergies.    Temp:  [97.5 °F (36.4 °C)-98 °F (36.7 °C)] 97.5 °F (36.4 °C)  Heart Rate:  [] 101  Resp:  [18] 18  BP: (115-164)/(58-89) 144/79    REVIEW OF SYSTEMS:  Review of Systems   Psychiatric/Behavioral: Positive for dysphoric mood and suicidal ideas. The patient is nervous/anxious.    All other systems reviewed and are negative.       OBJECTIVE    PHYSICAL EXAM:  Physical Exam  Vitals and nursing note reviewed.   Constitutional:       Appearance: He is well-developed.   HENT:      Head: Normocephalic and atraumatic.      Right Ear: External ear  normal.      Left Ear: External ear normal.      Nose: Nose normal.   Eyes:      Pupils: Pupils are equal, round, and reactive to light.   Pulmonary:      Effort: Pulmonary effort is normal. No respiratory distress.      Breath sounds: Normal breath sounds.   Abdominal:      General: There is no distension.      Palpations: Abdomen is soft.   Musculoskeletal:         General: No deformity. Normal range of motion.      Cervical back: Normal range of motion and neck supple.   Skin:     General: Skin is warm.      Findings: No rash.   Neurological:      Mental Status: He is alert and oriented to person, place, and time.      Coordination: Coordination normal.         MENTAL STATUS EXAM:   Hygiene:   fair  Cooperation:  Cooperative  Eye Contact:  Good  Psychomotor Behavior:  Appropriate  Affect:  Appropriate  Hopelessness: 5  Speech:  Normal  Thought Process: Linear  Thought Content:  Normal  Suicidal:  None  Homicidal:  None  Hallucinations:  None  Delusion:  None  Memory:  Intact  Orientation:  Person, Place, Time and Situation  Reliability:  fair  Insight:  Poor  Judgment:  Fair  Impulse Control:  Poor      Imaging Results (Last 24 Hours)     ** No results found for the last 24 hours. **           Lab Results   Component Value Date    GLUCOSE 86 02/06/2023    BUN 10 02/06/2023    CREATININE 0.95 02/06/2023    BCR 10.5 02/06/2023    CO2 26.9 02/06/2023    CALCIUM 9.5 02/06/2023    ALBUMIN 4.8 02/06/2023    AST 15 02/06/2023    ALT 11 02/06/2023       Lab Results   Component Value Date    WBC 4.17 02/06/2023    HGB 15.9 02/06/2023    HCT 47.2 02/06/2023    MCV 89.6 02/06/2023     02/06/2023       ECG/EMG Results (most recent)     Procedure Component Value Units Date/Time    ECG 12 Lead Other [[id number]] Collected: 02/06/23 1638     Updated: 02/06/23 2309     QT Interval 392 ms      QTC Interval 388 ms     Narrative:      Test Reason : Baseline Cardiac Status~  Blood Pressure :   */*   mmHG  Vent. Rate :  59 BPM      Atrial Rate :  59 BPM     P-R Int : 130 ms          QRS Dur :  94 ms      QT Int : 392 ms       P-R-T Axes :  56  66  49 degrees     QTc Int : 388 ms    Sinus bradycardia  Otherwise normal ECG  No previous ECGs available  Confirmed by Ann Youssef (2005) on 2/6/2023 11:09:33 PM    Referred By:            Confirmed By: Ann Youssef           Brief Urine Lab Results  (Last result in the past 365 days)      Color   Clarity   Blood   Leuk Est   Nitrite   Protein   CREAT   Urine HCG        02/06/23 1119 Dark Yellow   Clear   Negative   Negative   Negative   30 mg/dL (1+)                 Last Urine Toxicity     LAST URINE TOXICITY RESULTS Latest Ref Rng & Units 2/6/2023    AMPHETAMINES SCREEN, URINE Negative Negative    BARBITURATES SCREEN Negative Negative    BENZODIAZEPINE SCREEN, URINE Negative Negative    BUPRENORPHINEUR Negative Negative    COCAINE SCREEN, URINE Negative Negative    METHADONE SCREEN, URINE Negative Negative    METHAMPHETAMINEUR Negative Negative          Chart, notes, vitals, labs personally reviewed.  Outside [facility] report requested, reviewed, no controlled substances filled in [place] over the last year  UDS results:  + THC  EKG tracing personally reviewed, interpreted as sinus bradycardia, QTc interval 388  Consulted with patient's therapist regarding clinical history and treatment plan    ASSESSMENT & PLAN:    Suicidal Ideation  -Patient reports concern for emergence of SI, but denies active suicidality or intent to harm himself at any point  -Admit for crisis stabilization  -SP3    Unspecified anxiety disorder  -Likely related to ASD.  Presentation has elements of OCD, as well  -Begin sertraline 25 mg daily  -Patient is highly intelligent and well spoken.  He would do well in continued psychotherapy.  We will refer to outpatient therapist and psychiatric care    Autism spectrum disorder  -Prior diagnosis.  Likely contributing to current symptom burden  -Outpatient care as  above    Cannabis use disorder, severe, dependence  -Admission UDS positive  -Supportive care  -Ascertain substance abuse treatment plans following discharge    The patient has been admitted for safety and stabilization.  Patient will be monitored for suicidality daily and maintained on Special Precautions Level 3 (q15 min checks) .  The patient will have individual and group therapy with a master's level therapist. A master treatment plan will be developed and agreed upon by the patient and his/her treatment team.  The patient's estimated length of stay in the hospital is 5-7 days.     This note was generated using a scribeEstefanía.  The work documented in this note was completed, reviewed, and approved by the attending psychiatrist as designated Dr. Jeffery Almanza electronic signature.     Electronically signed by Jeffery Almanza MD at 02/07/23 1319     Laura Castañeda at 02/07/23 1101        [facility]  [email or web address]  [address] [place] [address]   [phone] - office  [phone] - fax - please fax d/c summary when available.     You have an appointment with Silvino on February 9 2023 at 11:30am.     Electronically signed by Laura Castañeda at 02/08/23 1318     Alethea Pride MSW at 02/07/23 1105        Goal Outcome Evaluation:  Plan of Care Reviewed With: patient, mother  Patient Agreement with Plan of Care: agrees  Consent Given to Review Plan with: janki and father  Progress: improving  Outcome Evaluation: Therapist met with Patient to review care plan social history, and aftercare recommendations; Patient agreeable.      Problem: Adult Behavioral Health Plan of Care  Goal: Plan of Care Review  Outcome: Ongoing, Progressing  Flowsheets (Taken 2/7/2023 1045)  Consent Given to Review Plan with: jolantaer and father  Progress: improving  Plan of Care Reviewed With:  • patient  • mother  Patient Agreement with Plan of Care: agrees  Outcome Evaluation:  •  Therapist met with Patient to review care plan social history, and aftercare recommendations  • Patient agreeable.  Goal: Patient-Specific Goal (Individualization)  Outcome: Ongoing, Progressing  Flowsheets  Taken 2/7/2023 1045 by Alethea Pride MSW  Patient-Specific Goals (Include Timeframe): Identify 2-3 coping skills, safety plan, and deny SI/HI prior to discharge.  Individualized Care Needs: Therapist to offer 1-4 therapy sessions, aftercare planning, safety planning, family education, group therapy, and brief CBT/MI interventions.  Taken 2/7/2023 1019 by Alethea Pride MSW  Patient Personal Strengths:  • resourceful  • resilient  • self-reliant  Patient Vulnerabilities: lacks insight into illness  Taken 2/6/2023 1755 by Jessie Mazariegos RN  Anxieties, Fears or Concerns: None verbalized  Goal: Optimized Coping Skills in Response to Life Stressors  Outcome: Ongoing, Progressing  Flowsheets (Taken 2/7/2023 1045)  Optimized Coping Skills in Response to Life Stressors: making progress toward outcome  Intervention: Promote Effective Coping Strategies  Flowsheets (Taken 2/7/2023 1045)  Supportive Measures:  • active listening utilized  • counseling provided  • goal-setting facilitated  • verbalization of feelings encouraged  Goal: Develops/Participates in Therapeutic [place] to Support Successful Transition  Outcome: Ongoing, Progressing  Flowsheets (Taken 2/7/2023 1045)  Develops/Participates in Therapeutic [place] to Support Successful Transition: making progress toward outcome  Intervention: Foster Therapeutic [place]  Flowsheets (Taken 2/7/2023 1045)  Trust Relationship/Rapport:  • care explained  • questions encouraged  • choices provided  • reassurance provided  • emotional support provided  • thoughts/feelings acknowledged  • empathic listening provided  • questions answered  Intervention: Mutually Develop Transition Plan  Flowsheets  Taken 2/7/2023 1045 by Alethea Pride  MSW  Outpatient/Agency/Support Group Needs:  • outpatient counseling  • outpatient medication management  Discharge Coordination/Progress:  • Therapist met with Patient to complete discharge needs assessment  • Patient agreeable.  Transition Support: follow-up care discussed  Anticipated Discharge Disposition: home or self-care  Transportation Concerns: no car  Current Discharge Risk: psychiatric illness  Concerns to be Addressed:  • coping/stress  • mental health  Readmission Within the Last 30 Days: no previous admission in last 30 days  Patient's Choice of Community Agency(s): [facility]  Patient/Family Anticipates Transition to: home  Offered/Gave Vendor List: no  Taken 2/6/2023 1755 by Jessie Mazariegos, RN  Transportation Anticipated: family or friend will provide  Patient/Family Anticipated Services at Transition:  • mental health services  • outpatient care     DATA: Therapist met individually with patient this date to introduce role and to discuss hospitalization expectations. Patient agreeable.     Patient signed consent for his parents, Kana and Christiano Irwin. This therapist called and spoke with Kana today. She states that Patient has struggled with his mental health off and on for the past few years. She states that Patient was seeing good results through therapy with his therapist Silvino at [facility] in [place]. However Patient stopped going due to his school schedule. She states that Patient is very intelligent and tends to over think things, and develop feelings of hopelessness. She states that they have been resistant to patient starting medications in the past, but they now feel that this may be needed for Patient. She feels that a short hospital stay would benefit Patient more than a long one as he is already complaining of not having enough to occupy his mind while here. This therapist completed safety planning with Fredo. This therapist advised that Patient should not  have any access to weapons, harmful objects, or medications. She states that all weapons are secured in her home, and that she will be in touch with Patient's girlfriend regarding safety measures that need to be taken while Patient is on campus. She states that Patient sees his girlfriend daily, and she should be able to administer his medications to him daily.     Patient signed consent for [facility] in [place].      Clinical Maneuvering/Intervention:     Therapist assisted patient in processing above session content; acknowledged and normalized patient’s thoughts, feelings, and concerns.  Discussed the therapist/patient relationship and explain the parameters and limitations of relative confidentiality.  Also discussed the importance of active participation, and honesty to the treatment process.  Encouraged the patient to discuss/vent their feelings, frustrations, and fears concerning their ongoing medical issues and validated their feelings.     Discussed the importance of finding enjoyable activities and coping skills that the patient can engage in a regular basis. Discussed healthy coping skills such as distraction, self love, grounding, thought challenges/reframing, etc.  Provided patient with list of healthy coping skills this date. Discussed the importance of medication compliance.  Praised the patient for seeking help and spent the majority of the session building rapport.       Allowed patient to freely discuss issues without interruption or judgment. Provided safe, confidential environment to facilitate the development of positive therapeutic relationship and encourage open, honest communication.      Therapist addressed discharge safety planning this date. Assisted patient in identifying risk factors which would indicate the need for higher level of care after discharge;  including thoughts to harm self or others and/or self-harming behavior. Encouraged patient to call 911, or present to  "the nearest emergency room should any of these events occur. Discussed crisis intervention services and means to access.  Encouraged securing any objects of harm.       Therapist completed integrated summary, treatment plan, and initiated social history this date.  Therapist is strongly encouraging family involvement in treatment.       ASSESSMENT: Ravinder Irwin is a 17 year old  male living in [place] [facility] where he is a student at [employer]. Patient states that he is a full time student and does not currently have a job. Patient was admitted for SI. He states that he intermittently experiences SI, and is usually able to distract himself from it. Patient's affect is bright and eurhythmic while speaking with this therapist. Patient states \"98% of the time I am like this (referencing his affect and personality)... the other 2% of the time I find myself in a really negative head space that causes some distress.\" Patient states that he is not really having any major stressors in his life at this time, aside from some stress caused by his academics. He states that his family and girlfriend are very good supports for him. Patient states that he does use THC multiple times a week, but that he does not use this as a way to cope with anxiety or depression. He states that he uses it to enhance experiences, such as watching cartoons. He states that he also uses shrooms occasionally. Patient states that he is here because he thinks that he may need to be started on medication, and he would like to see if he has a specific diagnosis. He is hopeful to not be here very long. Patient was calm and cooperative with assessment.      PLAN:       Patient to remain hospitalized this date.     Treatment team will focus efforts on stabilizing patient's acute symptoms while providing education on healthy coping and crisis management to reduce hospitalizations.   Patient requires daily psychiatrist " evaluation and 24/7 nursing supervision to promote patient  safety.     Therapist will offer 1-4 individual sessions, 1 therapy group daily, family education, and appropriate referral.    Therapist recommends outpatient medication management and therapy.     Electronically signed by Alethea Pride MSW at 02/07/23 1118     Ashley Ortiz, RN at 02/07/23 0131        Goal Outcome Evaluation:  Plan of Care Reviewed With: patient  Patient Agreement with Plan of Care: agrees        Pt states anxiety 6, depression 6. Pt is calm and cooperative with staff, med compliant.     Electronically signed by Ashley Ortiz, RN at 02/07/23 0131     Jessie Mazariegos, RN at 02/06/23 180          Problem: Adult Behavioral Health Plan of Care  Goal: Plan of Care Review  Recent Flowsheet Documentation  Taken 2/6/2023 1755 by Jessie Mazariegos, RN  Plan of Care Reviewed With: patient  Patient Agreement with Plan of Care: agrees   Goal Outcome Evaluation:  Plan of Care Reviewed With: patient  Patient Agreement with Plan of Care: agrees      New admission.  Care plan initiated.        Electronically signed by Jessie Mazariegos RN at 02/06/23 8791

## 2023-02-09 NOTE — DISCHARGE SUMMARY
PSYCHIATRIC DISCHARGE SUMMARY     Patient Identification:  Name:  Ravinder Irwin  Age:  17 y.o.  Sex:  male  :  2005  MRN:  [id number]  Visit Number:  [id number]    Date of Admission:2023   Date of Discharge: 2023     Discharge Diagnosis:  Active Problems:    Anxiety disorder, unspecified    Adjustment disorder with mixed disturbance of emotions and conduct    Autism spectrum disorder    Cannabis use disorder, moderate, dependence ([facility])      Admission Diagnosis:  Depression with suicidal ideation [F32.A, R45.851]     Hospital Course  Patient is a 17 y.o. male presented with mood disturbance, anxiety and concern for SI.  Admitted for crisis stabilization.  History of autism.  Admission labs with UDS positive for THC.  Ravinder is a personable and highly intelligent individual, having nearly completed his college degree while only 17 years old.  He reports episodes of significant mood lability and anxiety, which he largely relates to negative intrusive thoughts that become obsessive and have eventually led to thoughts of self-harm and SI in the past.  He denies previous suicide attempts.  He also smokes weed recreationally, generally to help with sleep, per his report.  He also reports experimenting with mushrooms recently.  He did not appear to be intoxicated or in withdrawal at any point during his time here.  Symptoms are likely partly related to underlying autism, as well.  We discussed several medication options, eventually deciding on sertraline 25 mg daily, which was well tolerated.  He reported no active suicidality and exhibited no behavior concerning for harm to himself or others.  Treatment and safe discharge planning was completed with family.  Outpatient care ascertained.    On the day of discharge, patient denied SI, HI or AVH. Patient was stable and appropriate by the conclusion of this admission, denying significant symptoms of mood, psychotic or thought disorder. Patient showed  "improvement of presenting symptoms and was deemed appropriate for discharge today.    Mental Status Exam upon discharge:   Mood \"better\"   Affect-congruent, appropriate, stable  Thought Content-goal directed, no delusional material present  Thought process-linear, organized.  Suicidality: No SI  Homicidality: No HI  Perception: No AH/VH    Procedures Performed         Consults:   Consults     No orders found from 1/8/2023 to 2/7/2023.          Pertinent Test Results:   Lab Results (last 7 days)     ** No results found for the last 168 hours. **          Condition on Discharge:  improved    Vital Signs       Discharge Disposition:  Home or Self Care    Discharge Medications:     Discharge Medications      New Medications      Instructions Start Date   sertraline 25 MG tablet  Commonly known as: ZOLOFT   25 mg, Oral, Daily             Discharge Diet: Normal  Diet Instructions    REGULAR DIET           Activity at Discharge: Normal  Activity Instructions    AS TOLERATED           Follow-up Appointments  No future appointments.      Test Results Pending at Discharge  None     Time: I spent greater than 30 minutes on this discharge activity which included: face-to-face encounter with the patient, reviewing the data in the system, coordination of the care with the nursing staff as well as consultants, documentation, and entering orders.      Clinician:   Jeffery Almanza MD  02/09/23  13:24 EST  "

## (undated) DEVICE — BNDG ELAS CO-FLEX SLF ADHR 6IN 5YD LF STRL

## (undated) DEVICE — Device

## (undated) DEVICE — UNDERCAST PADDING: Brand: DEROYAL

## (undated) DEVICE — TBG PUMP ARTHSCP MAIN AR6400 16FT

## (undated) DEVICE — HOLDER: Brand: DEROYAL

## (undated) DEVICE — NDL HYPO ECLPS SFTY 18G 1 1/2IN

## (undated) DEVICE — PENCL E/S HNDSWCH PUSHBTN HOLSTR 10FT

## (undated) DEVICE — DISPOSABLE TOURNIQUET CUFF SINGLE BLADDER, SINGLE PORT AND LUER LOCK CONNECTOR: Brand: COLOR CUFF

## (undated) DEVICE — APPL CHLORAPREP W/TINT 26ML ORNG

## (undated) DEVICE — WRP COMPR KN COLD UNIV

## (undated) DEVICE — SUT ETHLN 3-0 FS118IN 663H

## (undated) DEVICE — DRSNG TELFA PAD NONADH STR 1S 3X8IN

## (undated) DEVICE — PK KN ARTHSCP 70

## (undated) DEVICE — BNDG ELAS ELITE V/CLOSE 6IN 5YD LF STRL

## (undated) DEVICE — SYS SKIN CLS DERMABOND PRINEO W/22CM MESH TP

## (undated) DEVICE — SPNG GZ STRL 2S 4X4 12PLY

## (undated) DEVICE — ENCORE® LATEX MICRO SIZE 7.5, STERILE LATEX POWDER-FREE SURGICAL GLOVE: Brand: ENCORE

## (undated) DEVICE — PAD GRND REM POLYHESIVE A/ DISP

## (undated) DEVICE — DRSNG WND GZ CURAD OIL EMULSION 3X16IN LF STRL

## (undated) DEVICE — NDL HYPO ECLPS SFTY 22G 1 1/2IN

## (undated) DEVICE — NDL SPINE 22G 31/2IN BLK

## (undated) DEVICE — BLD CUT FORMLA AGGR PLS 4.0MM

## (undated) DEVICE — PK EXTREM LOWR 70

## (undated) DEVICE — IMMOB KN 3PNL DLX CANVS 19IN BLU